# Patient Record
Sex: FEMALE | Race: BLACK OR AFRICAN AMERICAN | URBAN - METROPOLITAN AREA
[De-identification: names, ages, dates, MRNs, and addresses within clinical notes are randomized per-mention and may not be internally consistent; named-entity substitution may affect disease eponyms.]

---

## 2021-11-30 ENCOUNTER — CLINICAL SUPPORT (OUTPATIENT)
Dept: SURGERY | Age: 66
End: 2021-11-30

## 2021-11-30 VITALS
OXYGEN SATURATION: 98 % | HEIGHT: 64 IN | WEIGHT: 267 LBS | TEMPERATURE: 97.2 F | RESPIRATION RATE: 18 BRPM | HEART RATE: 70 BPM | BODY MASS INDEX: 45.58 KG/M2

## 2021-11-30 DIAGNOSIS — Z12.11 COLON CANCER SCREENING: ICD-10-CM

## 2021-11-30 DIAGNOSIS — Z01.818 PRE-OP TESTING: Primary | ICD-10-CM

## 2021-11-30 RX ORDER — IBUPROFEN 800 MG/1
TABLET ORAL
COMMUNITY

## 2021-11-30 RX ORDER — OMEPRAZOLE 20 MG/1
20 CAPSULE, DELAYED RELEASE ORAL DAILY
COMMUNITY

## 2021-11-30 RX ORDER — PRAVASTATIN SODIUM 40 MG/1
40 TABLET ORAL
COMMUNITY

## 2021-11-30 NOTE — PROGRESS NOTES
Review of Systems   Constitutional: Negative. HENT: Negative. Eyes: Negative. Respiratory: Negative. Cardiovascular: Negative. Gastrointestinal: Negative. Genitourinary: Negative. Musculoskeletal: Negative. Skin: Negative. Neurological: Negative. Endo/Heme/Allergies: Negative. Psychiatric/Behavioral: Negative. Colon Screen    Patient: Kenia Patrick MRN: [id number]  SSN: [id number]    YOB: 1955  Age: 61 y.o. Sex: female        Subjective:   Kenia Patrick was referred by her PCP, Dipak Devi MD.  Patient referred for colonoscopy for   Screening colonoscopy. Patient denies rectal pain or bleeding. Abdominal surgeries as described below, specifically cholecystectomy. Family history as described below, specifically none. Patient has never had a colonoscopy. No Known Allergies    Past Medical History:   Diagnosis Date    GERD (gastroesophageal reflux disease)     Hypercholesterolemia      Past Surgical History:   Procedure Laterality Date    HX CHOLECYSTECTOMY  04/2021    stent placed for stones also, stent still in place      History reviewed. No pertinent family history. Social History     Tobacco Use    Smoking status: Never Smoker    Smokeless tobacco: Never Used   Substance Use Topics    Alcohol use: Not Currently      Prior to Admission medications    Medication Sig Start Date End Date Taking? Authorizing Provider   omeprazole (PRILOSEC) 20 mg capsule Take 20 mg by mouth daily. Yes Provider, Historical   pravastatin (PRAVACHOL) 40 mg tablet Take 40 mg by mouth nightly. Yes Provider, Historical   ibuprofen (MOTRIN) 800 mg tablet Take  by mouth. Yes Provider, Historical            Risks colonoscopy described- colon injury, missed lesion, anesthesia problems, bleeding       Rosangela Freire, LIDIA  November 30, 0495  4:38 PM

## 2022-01-13 ENCOUNTER — APPOINTMENT (OUTPATIENT)
Dept: NUTRITION | Age: 67
End: 2022-01-13

## 2022-03-02 ENCOUNTER — TRANSCRIBE ORDER (OUTPATIENT)
Dept: SCHEDULING | Age: 67
End: 2022-03-02

## 2022-03-02 DIAGNOSIS — Z12.31 VISIT FOR SCREENING MAMMOGRAM: Primary | ICD-10-CM

## 2022-03-17 ENCOUNTER — APPOINTMENT (OUTPATIENT)
Dept: NUTRITION | Age: 67
End: 2022-03-17

## 2022-08-02 ENCOUNTER — TRANSCRIBE ORDER (OUTPATIENT)
Dept: SCHEDULING | Age: 67
End: 2022-08-02

## 2022-08-02 DIAGNOSIS — N95.0 POSTMENOPAUSAL BLEEDING: Primary | ICD-10-CM

## 2023-01-30 DIAGNOSIS — Z12.31 VISIT FOR SCREENING MAMMOGRAM: Primary | ICD-10-CM

## 2023-01-31 DIAGNOSIS — N95.0 POSTMENOPAUSAL BLEEDING: Primary | ICD-10-CM

## 2023-02-03 DIAGNOSIS — Z12.31 VISIT FOR SCREENING MAMMOGRAM: Primary | ICD-10-CM

## 2023-02-03 DIAGNOSIS — N95.0 POSTMENOPAUSAL BLEEDING: Primary | ICD-10-CM

## 2023-04-07 ENCOUNTER — TRANSCRIBE ORDERS (OUTPATIENT)
Facility: HOSPITAL | Age: 68
End: 2023-04-07

## 2023-04-07 DIAGNOSIS — N95.0 POSTMENOPAUSAL BLEEDING: Primary | ICD-10-CM

## 2023-04-14 ENCOUNTER — HOSPITAL ENCOUNTER (OUTPATIENT)
Facility: HOSPITAL | Age: 68
Discharge: HOME | End: 2023-04-17
Payer: MEDICARE

## 2023-04-14 DIAGNOSIS — N95.0 POSTMENOPAUSAL BLEEDING: ICD-10-CM

## 2023-04-14 PROCEDURE — 76856 US EXAM PELVIC COMPLETE: CPT

## 2024-06-05 ENCOUNTER — HOSPITAL ENCOUNTER (OUTPATIENT)
Facility: HOSPITAL | Age: 69
Discharge: HOME | End: 2024-06-08
Payer: MEDICARE

## 2024-06-05 DIAGNOSIS — R91.1 LEFT LOWER LOBE PULMONARY NODULE: ICD-10-CM

## 2024-06-05 PROCEDURE — 6360000004 HC RX CONTRAST MEDICATION: Performed by: STUDENT IN AN ORGANIZED HEALTH CARE EDUCATION/TRAINING PROGRAM

## 2024-06-05 PROCEDURE — 71270 CT THORAX DX C-/C+: CPT

## 2024-06-05 RX ADMIN — IOPAMIDOL 80 ML: 612 INJECTION, SOLUTION INTRAVENOUS at 11:10

## 2024-06-09 LAB — CREAT UR-MCNC: 0.8 MG/DL (ref 0.6–1.3)
